# Patient Record
Sex: FEMALE | Race: WHITE | NOT HISPANIC OR LATINO | Employment: FULL TIME | URBAN - METROPOLITAN AREA
[De-identification: names, ages, dates, MRNs, and addresses within clinical notes are randomized per-mention and may not be internally consistent; named-entity substitution may affect disease eponyms.]

---

## 2020-09-01 ENCOUNTER — TRANSCRIBE ORDERS (OUTPATIENT)
Dept: ADMINISTRATIVE | Facility: HOSPITAL | Age: 65
End: 2020-09-01

## 2020-09-01 DIAGNOSIS — E06.3 CHRONIC LYMPHOCYTIC THYROIDITIS: ICD-10-CM

## 2020-09-01 DIAGNOSIS — E04.1 NONTOXIC UNINODULAR GOITER: Primary | ICD-10-CM

## 2021-02-26 DIAGNOSIS — Z23 ENCOUNTER FOR IMMUNIZATION: ICD-10-CM

## 2021-09-02 ENCOUNTER — HOSPITAL ENCOUNTER (OUTPATIENT)
Dept: ULTRASOUND IMAGING | Facility: HOSPITAL | Age: 66
Discharge: HOME | End: 2021-09-02
Attending: OTOLARYNGOLOGY
Payer: MEDICARE

## 2021-09-02 DIAGNOSIS — E04.1 NONTOXIC UNINODULAR GOITER: ICD-10-CM

## 2021-09-02 DIAGNOSIS — E06.3 CHRONIC LYMPHOCYTIC THYROIDITIS: ICD-10-CM

## 2021-09-02 PROCEDURE — 76536 US EXAM OF HEAD AND NECK: CPT

## 2021-09-10 RX ORDER — LANCETS
EACH MISCELLANEOUS
COMMUNITY

## 2021-09-10 RX ORDER — METOPROLOL TARTRATE 50 MG/1
50 TABLET, FILM COATED ORAL EVERY 12 HOURS SCHEDULED
COMMUNITY

## 2021-09-10 RX ORDER — ESCITALOPRAM OXALATE 20 MG/1
20 TABLET ORAL DAILY
COMMUNITY

## 2021-09-10 NOTE — PRE-PROCEDURE INSTRUCTIONS
Pre-Surgery Instructions:   Medication Instructions    Accu-Chek FastClix Lancets MISC Instructed patient per Anesthesia Guidelines   escitalopram (LEXAPRO) 20 mg tablet Instructed patient per Anesthesia Guidelines   metFORMIN (GLUCOPHAGE) 1000 MG tablet Instructed patient per Anesthesia Guidelines   metoprolol tartrate (LOPRESSOR) 50 mg tablet Instructed patient per Anesthesia Guidelines  Pt was instructed to not take metformin the am of surgery  Pt will only be taking metoprolol the am of surgery with a small sip of water

## 2021-09-22 ENCOUNTER — ANESTHESIA EVENT (OUTPATIENT)
Dept: PERIOP | Facility: HOSPITAL | Age: 66
End: 2021-09-22
Payer: MEDICARE

## 2021-09-22 NOTE — ANESTHESIA PREPROCEDURE EVALUATION
Procedure:  RIGHT AURICULAR AND LEFT PERIAURICULAR SEBACEOUS CYST EXCISIONS (Bilateral Face)    Relevant Problems   No relevant active problems        Physical Exam    Airway    Mallampati score: II  TM Distance: >3 FB  Neck ROM: full     Dental       Cardiovascular      Pulmonary      Other Findings        Anesthesia Plan  ASA Score- 2     Anesthesia Type- IV sedation with anesthesia with ASA Monitors  Additional Monitors:   Airway Plan:           Plan Factors-    Chart reviewed  Induction- intravenous  Postoperative Plan-     Informed Consent- Anesthetic plan and risks discussed with patient  I personally reviewed this patient with the CRNA  Discussed and agreed on the Anesthesia Plan with the CRNA  Nicole Hale

## 2021-09-23 ENCOUNTER — ANESTHESIA (OUTPATIENT)
Dept: PERIOP | Facility: HOSPITAL | Age: 66
End: 2021-09-23
Payer: MEDICARE

## 2021-09-23 ENCOUNTER — HOSPITAL ENCOUNTER (OUTPATIENT)
Facility: HOSPITAL | Age: 66
Setting detail: OUTPATIENT SURGERY
Discharge: HOME | End: 2021-09-23
Attending: OTOLARYNGOLOGY | Admitting: OTOLARYNGOLOGY
Payer: MEDICARE

## 2021-09-23 VITALS
BODY MASS INDEX: 35.87 KG/M2 | HEIGHT: 61 IN | TEMPERATURE: 97.6 F | HEART RATE: 77 BPM | SYSTOLIC BLOOD PRESSURE: 151 MMHG | OXYGEN SATURATION: 98 % | RESPIRATION RATE: 20 BRPM | DIASTOLIC BLOOD PRESSURE: 69 MMHG | WEIGHT: 190 LBS

## 2021-09-23 DIAGNOSIS — L72.3 SEBACEOUS CYST: ICD-10-CM

## 2021-09-23 LAB
GLUCOSE SERPL-MCNC: 172 MG/DL (ref 65–140)
GLUCOSE SERPL-MCNC: 196 MG/DL (ref 65–140)

## 2021-09-23 PROCEDURE — 88304 TISSUE EXAM BY PATHOLOGIST: CPT | Performed by: PATHOLOGY

## 2021-09-23 PROCEDURE — 82948 REAGENT STRIP/BLOOD GLUCOSE: CPT

## 2021-09-23 RX ORDER — LIDOCAINE HYDROCHLORIDE 10 MG/ML
INJECTION, SOLUTION EPIDURAL; INFILTRATION; INTRACAUDAL; PERINEURAL AS NEEDED
Status: DISCONTINUED | OUTPATIENT
Start: 2021-09-23 | End: 2021-09-23

## 2021-09-23 RX ORDER — FENTANYL CITRATE/PF 50 MCG/ML
50 SYRINGE (ML) INJECTION
Status: DISCONTINUED | OUTPATIENT
Start: 2021-09-23 | End: 2021-09-23 | Stop reason: HOSPADM

## 2021-09-23 RX ORDER — GINSENG 100 MG
CAPSULE ORAL AS NEEDED
Status: DISCONTINUED | OUTPATIENT
Start: 2021-09-23 | End: 2021-09-23 | Stop reason: HOSPADM

## 2021-09-23 RX ORDER — PROPOFOL 10 MG/ML
INJECTION, EMULSION INTRAVENOUS CONTINUOUS PRN
Status: DISCONTINUED | OUTPATIENT
Start: 2021-09-23 | End: 2021-09-23

## 2021-09-23 RX ORDER — DOXYCYCLINE HYCLATE 100 MG/1
100 CAPSULE ORAL EVERY 12 HOURS SCHEDULED
Qty: 10 CAPSULE | Refills: 0 | Status: SHIPPED | OUTPATIENT
Start: 2021-09-23 | End: 2021-09-28

## 2021-09-23 RX ORDER — LIDOCAINE HYDROCHLORIDE AND EPINEPHRINE 10; 10 MG/ML; UG/ML
INJECTION, SOLUTION INFILTRATION; PERINEURAL AS NEEDED
Status: DISCONTINUED | OUTPATIENT
Start: 2021-09-23 | End: 2021-09-23 | Stop reason: HOSPADM

## 2021-09-23 RX ORDER — DEXAMETHASONE SODIUM PHOSPHATE 4 MG/ML
INJECTION, SOLUTION INTRA-ARTICULAR; INTRALESIONAL; INTRAMUSCULAR; INTRAVENOUS; SOFT TISSUE AS NEEDED
Status: DISCONTINUED | OUTPATIENT
Start: 2021-09-23 | End: 2021-09-23

## 2021-09-23 RX ORDER — SODIUM CHLORIDE, SODIUM LACTATE, POTASSIUM CHLORIDE, CALCIUM CHLORIDE 600; 310; 30; 20 MG/100ML; MG/100ML; MG/100ML; MG/100ML
50 INJECTION, SOLUTION INTRAVENOUS CONTINUOUS
Status: DISCONTINUED | OUTPATIENT
Start: 2021-09-23 | End: 2021-09-23 | Stop reason: HOSPADM

## 2021-09-23 RX ORDER — ONDANSETRON 2 MG/ML
4 INJECTION INTRAMUSCULAR; INTRAVENOUS ONCE AS NEEDED
Status: DISCONTINUED | OUTPATIENT
Start: 2021-09-23 | End: 2021-09-23 | Stop reason: HOSPADM

## 2021-09-23 RX ORDER — PROPOFOL 10 MG/ML
INJECTION, EMULSION INTRAVENOUS AS NEEDED
Status: DISCONTINUED | OUTPATIENT
Start: 2021-09-23 | End: 2021-09-23

## 2021-09-23 RX ORDER — FENTANYL CITRATE 50 UG/ML
INJECTION, SOLUTION INTRAMUSCULAR; INTRAVENOUS AS NEEDED
Status: DISCONTINUED | OUTPATIENT
Start: 2021-09-23 | End: 2021-09-23

## 2021-09-23 RX ORDER — EPHEDRINE SULFATE 50 MG/ML
INJECTION INTRAVENOUS AS NEEDED
Status: DISCONTINUED | OUTPATIENT
Start: 2021-09-23 | End: 2021-09-23

## 2021-09-23 RX ORDER — MIDAZOLAM HYDROCHLORIDE 2 MG/2ML
INJECTION, SOLUTION INTRAMUSCULAR; INTRAVENOUS AS NEEDED
Status: DISCONTINUED | OUTPATIENT
Start: 2021-09-23 | End: 2021-09-23

## 2021-09-23 RX ORDER — GLYCOPYRROLATE 0.2 MG/ML
INJECTION INTRAMUSCULAR; INTRAVENOUS AS NEEDED
Status: DISCONTINUED | OUTPATIENT
Start: 2021-09-23 | End: 2021-09-23

## 2021-09-23 RX ORDER — METOCLOPRAMIDE HYDROCHLORIDE 5 MG/ML
10 INJECTION INTRAMUSCULAR; INTRAVENOUS ONCE AS NEEDED
Status: DISCONTINUED | OUTPATIENT
Start: 2021-09-23 | End: 2021-09-23 | Stop reason: HOSPADM

## 2021-09-23 RX ADMIN — EPHEDRINE SULFATE 5 MG: 50 INJECTION, SOLUTION INTRAVENOUS at 08:04

## 2021-09-23 RX ADMIN — GLYCOPYRROLATE 0.2 MG: 0.2 INJECTION, SOLUTION INTRAMUSCULAR; INTRAVENOUS at 07:49

## 2021-09-23 RX ADMIN — EPHEDRINE SULFATE 5 MG: 50 INJECTION, SOLUTION INTRAVENOUS at 08:16

## 2021-09-23 RX ADMIN — PROPOFOL 20 MG: 10 INJECTION, EMULSION INTRAVENOUS at 07:35

## 2021-09-23 RX ADMIN — SODIUM CHLORIDE, SODIUM LACTATE, POTASSIUM CHLORIDE, AND CALCIUM CHLORIDE: .6; .31; .03; .02 INJECTION, SOLUTION INTRAVENOUS at 07:28

## 2021-09-23 RX ADMIN — DEXAMETHASONE SODIUM PHOSPHATE 4 MG: 4 INJECTION INTRA-ARTICULAR; INTRALESIONAL; INTRAMUSCULAR; INTRAVENOUS; SOFT TISSUE at 07:43

## 2021-09-23 RX ADMIN — EPHEDRINE SULFATE 5 MG: 50 INJECTION, SOLUTION INTRAVENOUS at 08:31

## 2021-09-23 RX ADMIN — FENTANYL CITRATE 25 MCG: 0.05 INJECTION, SOLUTION INTRAMUSCULAR; INTRAVENOUS at 07:34

## 2021-09-23 RX ADMIN — EPHEDRINE SULFATE 10 MG: 50 INJECTION, SOLUTION INTRAVENOUS at 07:52

## 2021-09-23 RX ADMIN — LIDOCAINE HYDROCHLORIDE 50 MG: 10 INJECTION, SOLUTION EPIDURAL; INFILTRATION; INTRACAUDAL at 07:34

## 2021-09-23 RX ADMIN — MIDAZOLAM 2 MG: 1 INJECTION INTRAMUSCULAR; INTRAVENOUS at 07:29

## 2021-09-23 RX ADMIN — PROPOFOL 100 MCG/KG/MIN: 10 INJECTION, EMULSION INTRAVENOUS at 07:34

## 2021-09-23 RX ADMIN — PROPOFOL 40 MG: 10 INJECTION, EMULSION INTRAVENOUS at 07:34

## 2021-09-23 NOTE — INTERIM OP NOTE
RIGHT AURICULAR AND LEFT PERIAURICULAR SEBACEOUS CYST EXCISIONS  Postoperative Note  PATIENT NAME: Jone Javed  : 1955  MRN: [id number]  [facility] ROOM 02    Surgery Date: 2021    Preop Diagnosis:  Sebaceous cyst [L72 3]    Post-Op Diagnosis Codes:     * Sebaceous cyst [L72 3]    Procedure(s) (LRB):  RIGHT AURICULAR AND LEFT PERIAURICULAR SEBACEOUS CYST EXCISIONS (Bilateral)    Surgeon(s) and Role:      Milagros Mahoney MD - Primary    Specimens:  ID Type Source Tests Collected by Time Destination   1 : RIGHT AURICULAR SEBACEOUS CYST Tissue Cyst TISSUE EXAM Noemi Prajapati MD 2021  7:57 AM    2 : LEFT PREAURICULAR SEBACEOUS CYST Tissue Cyst TISSUE EXAM Noemi Prajapati MD 2021  8:22 AM        Estimated Blood Loss:   Minimal    Anesthesia Type:   IV Sedation with Anesthesia     Findings:    2 5 cm right auricular helix sebaceous cyst   1 5 cm left preauricular sebaceous cyst  Complications:   None    SIGNATURE: Noemi Prajapati MD   DATE: 2021   TIME: 8:32 AM

## 2021-09-23 NOTE — DISCHARGE INSTRUCTIONS
You may wash wound gently  Pat dry  Doxycycline 100 mg 2 times daily 5 days  Bacitracin ointment to wound twice daily    Tylenol or ibuprofen as needed for pain

## 2021-09-23 NOTE — OP NOTE
OPERATIVE REPORT  PATIENT NAME: Sofy Avalos    :  1955  MRN: [id number]  Pt Location: [facility] OR ROOM 02    SURGERY DATE: 2021    Surgeon(s) and Role: Wallace José MD - Primary    Preop Diagnosis:  Sebaceous cyst [L72 3]    Post-Op Diagnosis Codes:     * Sebaceous cyst [L72 3]    Procedure(s) (LRB):  RIGHT AURICULAR AND LEFT PERIAURICULAR SEBACEOUS CYST EXCISIONS (Bilateral)    Specimen(s):  ID Type Source Tests Collected by Time Destination   1 : RIGHT AURICULAR SEBACEOUS CYST Tissue Cyst TISSUE EXAM Kota Sanders MD 2021  7:57 AM    2 : LEFT PREAURICULAR SEBACEOUS CYST Tissue Cyst TISSUE EXAM Kota Sanders MD 2021  8:22 AM        Estimated Blood Loss:   Minimal    Drains:  * No LDAs found *    Anesthesia Type:   IV Sedation with Anesthesia    Operative Indications:  Sebaceous cyst [L72 3]  Left preauricular and right auricular helix areas    Operative Findings:  2 5 cm right auricular helix sebaceous cyst and 1 5 cm left preauricular cyst    Complications:   None    Procedure and Technique:  The patient was taken the operating room underwent local IV sedation  Next, an ellipse was diagrammed over the right auricular helix sebaceous cyst which measured 2 5 cm  And it was then injected with 1% lidocaine with epinephrine  Then a vertical the lips was diagrammed over the left preauricular sebaceous cyst and it was injected 1% lidocaine with epinephrine  A time-out was called  The patient was then prepped and draped in the usual fashion  Attention was then turned to the right of regular helix where the helix was then completed with a 15 blade  Dissection was then carried and a subcutaneous level  As this was being performed the cyst was ruptured  There was a mucoid slightly purulent discharge noted  The rest of the capsule identified and then followed circumferentially with use of a curved iris scissors until the remainder of the cyst was completely removed    No residual cystic structure was left remaining in the wound bed  Bipolar cautery at 15 w coag used complete hemostasis  The wound was then closed in layers with interrupted 5 0 Vicryl dermal each and then a running 6 0 nylon was used to approximate skin  Attention was then turned to the left preauricular area  The previously diagrammed vertical ellipse was then cut with a 15 blade and then dissection was then carried circumferentially around the cyst without rupture  The cyst was successfully delivered  Bipolar cautery was used to complete hemostasis  The wound was then irrigated  The wound was then closed in layers with 5 0 Vicryl to suture the dermis together and then running 6 0 nylon was used to approximate skin  The closure of the left preauricular area measured 2 cm and the closure of the right auricular helix area measured 3 cm  Bacitracin ointment was applied  Both specimens were to pathology  The procedure was then terminated and the patient was taken to recovery room in stable condition upon awakening     I was present for the entire procedure    Patient Disposition:  PACU     SIGNATURE: Hunter Hughes MD  DATE: September 23, 2021  TIME: 8:35 AM

## 2021-09-23 NOTE — ANESTHESIA POSTPROCEDURE EVALUATION
Post-Op Assessment Note    CV Status:  Stable  Pain Score: 0    Pain management: adequate  Multimodal analgesia used between 6 hours prior to anesthesia start to PACU discharge    Mental Status:  Alert and awake   Hydration Status:  Euvolemic   PONV Controlled:  Controlled   Airway Patency:  Patent   Two or more mitigation strategies used for obstructive sleep apnea   Post Op Vitals Reviewed: Yes      Staff: CRNA         No complications documented      BP (P) 109/58 (09/23/21 0840)    Temp (P) 97 7 °F (36 5 °C) (09/23/21 0840)    Pulse (P) 79 (09/23/21 0840)   Resp (P) 20 (09/23/21 0840)    SpO2 (P) 97 % (09/23/21 0840)

## 2021-09-23 NOTE — INTERVAL H&P NOTE
H&P reviewed  After examining the patient I find no changes in the patients condition since the H&P had been written      Vitals:    09/23/21 0618   BP: (!) 179/92   Pulse: 56   Resp: 20   Temp: (!) 97 4 °F (36 3 °C)   SpO2: 98%

## 2021-11-08 ENCOUNTER — NURSE TRIAGE (OUTPATIENT)
Dept: OTHER | Facility: OTHER | Age: 66
End: 2021-11-08

## 2021-11-08 DIAGNOSIS — Z20.822 ENCOUNTER FOR SCREENING LABORATORY TESTING FOR COVID-19 VIRUS IN ASYMPTOMATIC PATIENT: Primary | ICD-10-CM

## 2021-11-09 PROCEDURE — U0003 INFECTIOUS AGENT DETECTION BY NUCLEIC ACID (DNA OR RNA); SEVERE ACUTE RESPIRATORY SYNDROME CORONAVIRUS 2 (SARS-COV-2) (CORONAVIRUS DISEASE [COVID-19]), AMPLIFIED PROBE TECHNIQUE, MAKING USE OF HIGH THROUGHPUT TECHNOLOGIES AS DESCRIBED BY CMS-2020-01-R: HCPCS | Performed by: FAMILY MEDICINE

## 2021-11-09 PROCEDURE — U0005 INFEC AGEN DETEC AMPLI PROBE: HCPCS | Performed by: FAMILY MEDICINE

## 2023-03-30 ENCOUNTER — HOSPITAL ENCOUNTER (OUTPATIENT)
Dept: CT IMAGING | Facility: HOSPITAL | Age: 68
End: 2023-03-30

## 2023-03-30 ENCOUNTER — HOSPITAL ENCOUNTER (OUTPATIENT)
Dept: RADIOLOGY | Facility: CLINIC | Age: 68
End: 2023-03-30

## 2023-03-30 VITALS — BODY MASS INDEX: 35.87 KG/M2 | HEIGHT: 61 IN | WEIGHT: 190 LBS

## 2023-03-30 DIAGNOSIS — Z87.891 PERSONAL HISTORY OF NICOTINE DEPENDENCE: ICD-10-CM

## 2023-03-30 DIAGNOSIS — Z12.2 ENCOUNTER FOR SCREENING FOR MALIGNANT NEOPLASM OF RESPIRATORY ORGANS: ICD-10-CM

## 2023-03-30 DIAGNOSIS — Z12.31 ENCOUNTER FOR SCREENING MAMMOGRAM FOR MALIGNANT NEOPLASM OF BREAST: ICD-10-CM

## 2023-04-19 ENCOUNTER — HOSPITAL ENCOUNTER (OUTPATIENT)
Dept: RADIOLOGY | Facility: CLINIC | Age: 68
Discharge: HOME | End: 2023-04-19

## 2023-04-19 DIAGNOSIS — R92.8 ABNORMAL SCREENING MAMMOGRAM: ICD-10-CM

## 2023-10-25 ENCOUNTER — HOSPITAL ENCOUNTER (OUTPATIENT)
Dept: RADIOLOGY | Facility: CLINIC | Age: 68
Discharge: HOME | End: 2023-10-25
Payer: MEDICARE

## 2023-10-25 DIAGNOSIS — R92.8 ABNORMALITY OF RIGHT BREAST ON SCREENING MAMMOGRAM: ICD-10-CM

## 2023-10-25 PROCEDURE — 76642 ULTRASOUND BREAST LIMITED: CPT

## 2024-01-12 ENCOUNTER — HOSPITAL ENCOUNTER (EMERGENCY)
Facility: HOSPITAL | Age: 69
Discharge: HOME | End: 2024-01-12
Attending: EMERGENCY MEDICINE
Payer: MEDICARE

## 2024-01-12 ENCOUNTER — APPOINTMENT (EMERGENCY)
Dept: RADIOLOGY | Facility: HOSPITAL | Age: 69
End: 2024-01-12
Payer: MEDICARE

## 2024-01-12 VITALS
SYSTOLIC BLOOD PRESSURE: 141 MMHG | RESPIRATION RATE: 25 BRPM | DIASTOLIC BLOOD PRESSURE: 77 MMHG | BODY MASS INDEX: 35.45 KG/M2 | OXYGEN SATURATION: 95 % | WEIGHT: 187.61 LBS | HEART RATE: 51 BPM | TEMPERATURE: 98.1 F

## 2024-01-12 DIAGNOSIS — U07.1 COVID: Primary | ICD-10-CM

## 2024-01-12 DIAGNOSIS — R06.02 EXERTIONAL SHORTNESS OF BREATH: ICD-10-CM

## 2024-01-12 LAB
ALBUMIN SERPL BCP-MCNC: 4.2 G/DL (ref 3.5–5)
ALP SERPL-CCNC: 85 U/L (ref 34–104)
ALT SERPL W P-5'-P-CCNC: 20 U/L (ref 7–52)
ANION GAP SERPL CALCULATED.3IONS-SCNC: 7 MMOL/L
AST SERPL W P-5'-P-CCNC: 15 U/L (ref 13–39)
ATRIAL RATE: 50 BPM
BASOPHILS # BLD AUTO: 0.03 THOUSANDS/ÂΜL (ref 0–0.1)
BASOPHILS NFR BLD AUTO: 1 % (ref 0–1)
BILIRUB SERPL-MCNC: 0.59 MG/DL (ref 0.2–1)
BNP SERPL-MCNC: 34 PG/ML (ref 0–100)
BUN SERPL-MCNC: 18 MG/DL (ref 5–25)
CALCIUM SERPL-MCNC: 9.6 MG/DL (ref 8.4–10.2)
CARDIAC TROPONIN I PNL SERPL HS: 4 NG/L
CHLORIDE SERPL-SCNC: 103 MMOL/L (ref 96–108)
CO2 SERPL-SCNC: 25 MMOL/L (ref 21–32)
CREAT SERPL-MCNC: 0.93 MG/DL (ref 0.6–1.3)
EOSINOPHIL # BLD AUTO: 0.14 THOUSAND/ÂΜL (ref 0–0.61)
EOSINOPHIL NFR BLD AUTO: 3 % (ref 0–6)
ERYTHROCYTE [DISTWIDTH] IN BLOOD BY AUTOMATED COUNT: 13.1 % (ref 11.6–15.1)
FLUAV RNA RESP QL NAA+PROBE: NEGATIVE
FLUBV RNA RESP QL NAA+PROBE: NEGATIVE
GFR SERPL CREATININE-BSD FRML MDRD: 62 ML/MIN/1.73SQ M
GLUCOSE SERPL-MCNC: 168 MG/DL (ref 65–140)
HCT VFR BLD AUTO: 43.1 % (ref 34.8–46.1)
HGB BLD-MCNC: 14.3 G/DL (ref 11.5–15.4)
IMM GRANULOCYTES # BLD AUTO: 0.02 THOUSAND/UL (ref 0–0.2)
IMM GRANULOCYTES NFR BLD AUTO: 0 % (ref 0–2)
LYMPHOCYTES # BLD AUTO: 1.15 THOUSANDS/ÂΜL (ref 0.6–4.47)
LYMPHOCYTES NFR BLD AUTO: 24 % (ref 14–44)
MCH RBC QN AUTO: 30 PG (ref 26.8–34.3)
MCHC RBC AUTO-ENTMCNC: 33.2 G/DL (ref 31.4–37.4)
MCV RBC AUTO: 90 FL (ref 82–98)
MONOCYTES # BLD AUTO: 0.33 THOUSAND/ÂΜL (ref 0.17–1.22)
MONOCYTES NFR BLD AUTO: 7 % (ref 4–12)
NEUTROPHILS # BLD AUTO: 3.16 THOUSANDS/ÂΜL (ref 1.85–7.62)
NEUTS SEG NFR BLD AUTO: 65 % (ref 43–75)
NRBC BLD AUTO-RTO: 0 /100 WBCS
P AXIS: 42 DEGREES
PLATELET # BLD AUTO: 274 THOUSANDS/UL (ref 149–390)
PMV BLD AUTO: 10.2 FL (ref 8.9–12.7)
POTASSIUM SERPL-SCNC: 4.5 MMOL/L (ref 3.5–5.3)
PR INTERVAL: 148 MS
PROT SERPL-MCNC: 6.8 G/DL (ref 6.4–8.4)
QRS AXIS: 11 DEGREES
QRSD INTERVAL: 80 MS
QT INTERVAL: 452 MS
QTC INTERVAL: 412 MS
RBC # BLD AUTO: 4.77 MILLION/UL (ref 3.81–5.12)
RSV RNA RESP QL NAA+PROBE: NEGATIVE
SARS-COV-2 RNA RESP QL NAA+PROBE: POSITIVE
SODIUM SERPL-SCNC: 135 MMOL/L (ref 135–147)
T WAVE AXIS: 38 DEGREES
VENTRICULAR RATE: 50 BPM
WBC # BLD AUTO: 4.83 THOUSAND/UL (ref 4.31–10.16)

## 2024-01-12 PROCEDURE — 93005 ELECTROCARDIOGRAM TRACING: CPT

## 2024-01-12 PROCEDURE — 80053 COMPREHEN METABOLIC PANEL: CPT | Performed by: PHYSICIAN ASSISTANT

## 2024-01-12 PROCEDURE — 83880 ASSAY OF NATRIURETIC PEPTIDE: CPT | Performed by: PHYSICIAN ASSISTANT

## 2024-01-12 PROCEDURE — 36415 COLL VENOUS BLD VENIPUNCTURE: CPT | Performed by: PHYSICIAN ASSISTANT

## 2024-01-12 PROCEDURE — 71046 X-RAY EXAM CHEST 2 VIEWS: CPT

## 2024-01-12 PROCEDURE — 99285 EMERGENCY DEPT VISIT HI MDM: CPT | Performed by: PHYSICIAN ASSISTANT

## 2024-01-12 PROCEDURE — 85025 COMPLETE CBC W/AUTO DIFF WBC: CPT | Performed by: PHYSICIAN ASSISTANT

## 2024-01-12 PROCEDURE — 0241U HB NFCT DS VIR RESP RNA 4 TRGT: CPT | Performed by: PHYSICIAN ASSISTANT

## 2024-01-12 PROCEDURE — 84484 ASSAY OF TROPONIN QUANT: CPT | Performed by: PHYSICIAN ASSISTANT

## 2024-01-12 RX ORDER — ALBUTEROL SULFATE 90 UG/1
2 AEROSOL, METERED RESPIRATORY (INHALATION) ONCE
Status: COMPLETED | OUTPATIENT
Start: 2024-01-12 | End: 2024-01-12

## 2024-01-12 RX ORDER — BENZONATATE 100 MG/1
100 CAPSULE ORAL EVERY 8 HOURS
Qty: 21 CAPSULE | Refills: 0 | Status: SHIPPED | OUTPATIENT
Start: 2024-01-12

## 2024-01-12 RX ORDER — DEXAMETHASONE SODIUM PHOSPHATE 10 MG/ML
8 INJECTION, SOLUTION INTRAMUSCULAR; INTRAVENOUS ONCE
Status: COMPLETED | OUTPATIENT
Start: 2024-01-12 | End: 2024-01-12

## 2024-01-12 RX ADMIN — ALBUTEROL SULFATE 2 PUFF: 90 AEROSOL, METERED RESPIRATORY (INHALATION) at 11:21

## 2024-01-12 RX ADMIN — DEXAMETHASONE SODIUM PHOSPHATE 8 MG: 10 INJECTION, SOLUTION INTRAMUSCULAR; INTRAVENOUS at 11:18

## 2024-01-12 NOTE — DISCHARGE INSTRUCTIONS
Continue with good symptomatic care as we discussed.  Please follow-up with our cardiologist a referral has been placed for you and there number has been listed above.  Please return with new or worsening symptoms

## 2024-01-12 NOTE — Clinical Note
Hortensia Rodriguez was seen and treated in our emergency department on 1/12/2024.                Diagnosis:     Hortensia  may return to work on return date.    She may return on this date: 01/22/2024         If you have any questions or concerns, please don't hesitate to call.      Jaqui Rizzo PA-C    ______________________________           _______________          _______________  Hospital Representative                              Date                                Time

## 2024-01-12 NOTE — ED PROVIDER NOTES
History  Chief Complaint   Patient presents with    Cough     Intermittently for 2-3 weeks, and yesterday had some dyspnea upon exertion and chest heaviness. Reports she came in for some cough medicine and to go home.      68-year-old female presents the emergency department for evaluation of cough, congestion.  States symptoms have been intermittent for the past 2 to 3 weeks.  Reports she has a cough for several days which seems to get better for day and then returns.  Had an appoint with her PCP today due to this.  While at this visit she discussed ongoing shortness of breath which spouse states have been ongoing for months.  This is only noted when patient is exerting herself.  States yesterday when she was walking down the [place] of the [employer] where she works she was winded by the end and had some chest heaviness.  States she took a second to rest and then felt improved and was able to walk back without any difficulty.  Patient denies any cardiac history.  Does not follow-up with cardiologist regularly.  At her PCPs office she was told she had an abnormal EKG and instructed to come to the emergency department for further evaluation.  Patient has no chest pain now.  She reports no current shortness of breath at rest.  Denies any leg swelling.  Denies palpitations.  Patient is a former-smoker.        Prior to Admission Medications   Prescriptions Last Dose Informant Patient Reported? Taking?   Accu-Chek FastClix Lancets MISC   Yes No   Sig: Use   escitalopram (LEXAPRO) 20 mg tablet   Yes No   Sig: Take 20 mg by mouth daily   metFORMIN (GLUCOPHAGE) 1000 MG tablet   Yes No   Sig: Take 1,000 mg by mouth daily with breakfast   metoprolol tartrate (LOPRESSOR) 50 mg tablet   Yes No   Sig: Take 50 mg by mouth every 12 (twelve) hours      Facility-Administered Medications: None       Past Medical History:   Diagnosis Date    Anxiety     Cervical cancer (HCC)     age 24    Diabetes mellitus (HCC)     Hashimoto's  disease     Hypertension     Kidney stone     Pt reports that it passes on it's own    Knee pain, left     Pt states it is intermittent.    Sebaceous cyst of ear     Right auricular and left periauricular    Shortness of breath     Pt reports with moderate exertion    Vocal cord nodules     Wears dentures     Wears glasses        Past Surgical History:   Procedure Laterality Date    CERVICAL CONIZATION   W/ LASER      DILATION AND CURETTAGE OF UTERUS      FACIAL/NECK BIOPSY Bilateral 09/23/2021    Procedure: RIGHT AURICULAR AND LEFT PERIAURICULAR SEBACEOUS CYST EXCISIONS;  Surgeon: Shan Veloz MD;  Location: [facility];  Service: ENT    FRACTURE SURGERY Left     Left ankle fracture- has pins, screws and a plate    HYSTERECTOMY  1979    OOPHORECTOMY N/A 1979    one ovary remaining    THROAT SURGERY      Pt had vocal cord nodules removed.    TONSILLECTOMY         Family History   Problem Relation Age of Onset    Cancer Mother         oral    Prostate cancer Father     Kidney cancer Father     No Known Problems Sister     No Known Problems Sister     No Known Problems Daughter     No Known Problems Maternal Grandmother     No Known Problems Maternal Grandfather     No Known Problems Paternal Grandmother     No Known Problems Paternal Grandfather     Colon cancer Maternal Aunt     No Known Problems Maternal Aunt     No Known Problems Maternal Aunt     No Known Problems Maternal Aunt     No Known Problems Maternal Aunt     No Known Problems Paternal Aunt     Breast cancer Neg Hx     BRCA2 Positive Neg Hx     BRCA2 Negative Neg Hx     BRCA1 Negative Neg Hx     BRCA1 Positive Neg Hx     BRCA 1/2 Neg Hx     Endometrial cancer Neg Hx     Ovarian cancer Neg Hx     Breast cancer additional onset Neg Hx      I have reviewed and agree with the history as documented.    E-Cigarette/Vaping    E-Cigarette Use Never User      E-Cigarette/Vaping Substances     Social History     Tobacco Use    Smoking status: Former     Types:  Cigarettes    Smokeless tobacco: Never    Tobacco comments:     Pt quit in 2013   Vaping Use    Vaping status: Never Used   Substance Use Topics    Alcohol use: Yes     Comment: socially    Drug use: Not Currently       Review of Systems   Constitutional:  Negative for appetite change, fatigue and fever.   HENT:  Positive for congestion.    Respiratory:  Positive for cough and shortness of breath (With exertion). Negative for choking.    Cardiovascular:  Negative for chest pain, palpitations and leg swelling.   Gastrointestinal: Negative.    Musculoskeletal: Negative.    Skin: Negative.    Neurological: Negative.    All other systems reviewed and are negative.      Physical Exam  Physical Exam  Vitals and nursing note reviewed.   Constitutional:       General: She is not in acute distress.     Appearance: Normal appearance. She is not ill-appearing, toxic-appearing or diaphoretic.   HENT:      Head: Normocephalic.      Nose: Nose normal.   Eyes:      Conjunctiva/sclera: Conjunctivae normal.      Pupils: Pupils are equal, round, and reactive to light.   Cardiovascular:      Rate and Rhythm: Normal rate and regular rhythm.   Pulmonary:      Effort: Pulmonary effort is normal.      Breath sounds: No stridor. Wheezing present. No rhonchi or rales.   Chest:      Chest wall: No tenderness.   Abdominal:      General: Abdomen is flat. Bowel sounds are normal. There is no distension.      Palpations: Abdomen is soft.      Tenderness: There is no abdominal tenderness. There is no guarding.   Musculoskeletal:         General: Normal range of motion.      Cervical back: Normal range of motion.      Right lower leg: No edema.      Left lower leg: No edema.   Skin:     General: Skin is warm and dry.      Capillary Refill: Capillary refill takes less than 2 seconds.      Findings: No erythema or rash.   Neurological:      General: No focal deficit present.      Mental Status: She is alert.   Psychiatric:         Mood and Affect:  Mood normal.         Vital Signs  ED Triage Vitals   Temperature Pulse Respirations Blood Pressure SpO2   01/12/24 0924 01/12/24 0921 01/12/24 0921 01/12/24 0924 01/12/24 0921   98.1 °F (36.7 °C) 55 18 (!) 200/87 98 %      Temp Source Heart Rate Source Patient Position - Orthostatic VS BP Location FiO2 (%)   01/12/24 0924 01/12/24 0921 01/12/24 0921 01/12/24 0921 --   Temporal Monitor Lying Right arm       Pain Score       01/12/24 0921       No Pain           Vitals:    01/12/24 1030 01/12/24 1045 01/12/24 1100 01/12/24 1115   BP: (!) 174/79  141/77    Pulse: (!) 54 (!) 54 58 (!) 51   Patient Position - Orthostatic VS:   Sitting          Visual Acuity      ED Medications  Medications   albuterol (PROVENTIL HFA,VENTOLIN HFA) inhaler 2 puff (2 puffs Inhalation Given 1/12/24 1121)   dexamethasone (PF) (DECADRON) injection 8 mg (8 mg Intravenous Given 1/12/24 1118)       Diagnostic Studies  Results Reviewed       Procedure Component Value Units Date/Time    HS Troponin I 4hr [[id number]]     Lab Status: No result Specimen: Blood     FLU/RSV/COVID - if FLU/RSV clinically relevant [[id number]]  (Abnormal) Collected: 01/12/24 0948    Lab Status: Final result Specimen: Nares from Nose Updated: 01/12/24 1032     SARS-CoV-2 Positive     INFLUENZA A PCR Negative     INFLUENZA B PCR Negative     RSV PCR Negative    Narrative:      FOR PEDIATRIC PATIENTS - copy/paste COVID Guidelines URL to browser: [email or web address]    SARS-CoV-2 assay is a Nucleic Acid Amplification assay intended for the  qualitative detection of nucleic acid from SARS-CoV-2 in nasopharyngeal  swabs. Results are for the presumptive identification of SARS-CoV-2 RNA.    Positive results are indicative of infection with SARS-CoV-2, the virus  causing COVID-19, but do not rule out bacterial infection or co-infection  with other viruses. Laboratories within the [place] and its  territories are required  to report all positive results to the appropriate  public health authorities. Negative results do not preclude SARS-CoV-2  infection and should not be used as the sole basis for treatment or other  patient management decisions. Negative results must be combined with  clinical observations, patient history, and epidemiological information.  This test has not been FDA cleared or approved.    This test has been authorized by FDA under an Emergency Use Authorization  (EUA). This test is only authorized for the duration of time the  declaration that circumstances exist justifying the authorization of the  emergency use of an in vitro diagnostic tests for detection of SARS-CoV-2  virus and/or diagnosis of COVID-19 infection under section 564(b)(1) of  the Act, 21 U.S.C. 360bbb-3(b)(1), unless the authorization is terminated  or revoked sooner. The test has been validated but independent review by FDA  and CLIA is pending.    Test performed using [employer] GeneXpert: This RT-PCR assay targets N2,  a region unique to SARS-CoV-2. A conserved region in the E-gene was chosen  for pan-Sarbecovirus detection which includes SARS-CoV-2.    According to CMS-2020-01-R, this platform meets the definition of high-throughput technology.    HS Troponin 0hr (reflex protocol) [[id number]]  (Normal) Collected: 01/12/24 0948    Lab Status: Final result Specimen: Blood from Arm, Right Updated: 01/12/24 1015     hs TnI 0hr 4 ng/L     HS Troponin I 2hr [[id number]]     Lab Status: No result Specimen: Blood     B-Type Natriuretic Peptide(BNP) [[id number]]  (Normal) Collected: 01/12/24 0948    Lab Status: Final result Specimen: Blood from Arm, Right Updated: 01/12/24 1015     BNP 34 pg/mL     Comprehensive metabolic panel [[id number]]  (Abnormal) Collected: 01/12/24 0948    Lab Status: Final result Specimen: Blood from Arm, Right Updated: 01/12/24 1008     Sodium 135 mmol/L      Potassium 4.5 mmol/L      Chloride 103 mmol/L      CO2 25 mmol/L       ANION GAP 7 mmol/L      BUN 18 mg/dL      Creatinine 0.93 mg/dL      Glucose 168 mg/dL      Calcium 9.6 mg/dL      AST 15 U/L      ALT 20 U/L      Alkaline Phosphatase 85 U/L      Total Protein 6.8 g/dL      Albumin 4.2 g/dL      Total Bilirubin 0.59 mg/dL      eGFR 62 ml/min/1.73sq m     Narrative:      National Kidney Disease Foundation guidelines for Chronic Kidney Disease (CKD):     Stage 1 with normal or high GFR (GFR > 90 mL/min/1.73 square meters)    Stage 2 Mild CKD (GFR = 60-89 mL/min/1.73 square meters)    Stage 3A Moderate CKD (GFR = 45-59 mL/min/1.73 square meters)    Stage 3B Moderate CKD (GFR = 30-44 mL/min/1.73 square meters)    Stage 4 Severe CKD (GFR = 15-29 mL/min/1.73 square meters)    Stage 5 End Stage CKD (GFR <15 mL/min/1.73 square meters)  Note: GFR calculation is accurate only with a steady state creatinine    CBC and differential [[id number]] Collected: 01/12/24 0948    Lab Status: Final result Specimen: Blood from Arm, Right Updated: 01/12/24 0954     WBC 4.83 Thousand/uL      RBC 4.77 Million/uL      Hemoglobin 14.3 g/dL      Hematocrit 43.1 %      MCV 90 fL      MCH 30.0 pg      MCHC 33.2 g/dL      RDW 13.1 %      MPV 10.2 fL      Platelets 274 Thousands/uL      nRBC 0 /100 WBCs      Neutrophils Relative 65 %      Immat GRANS % 0 %      Lymphocytes Relative 24 %      Monocytes Relative 7 %      Eosinophils Relative 3 %      Basophils Relative 1 %      Neutrophils Absolute 3.16 Thousands/µL      Immature Grans Absolute 0.02 Thousand/uL      Lymphocytes Absolute 1.15 Thousands/µL      Monocytes Absolute 0.33 Thousand/µL      Eosinophils Absolute 0.14 Thousand/µL      Basophils Absolute 0.03 Thousands/µL                    XR chest 2 views   Final Result by Yuridia Colon MD (01/12 1218)      No acute cardiopulmonary disease.               Workstation performed: [id number]                    Procedures  ECG 12 Lead Documentation Only    Date/Time: 1/12/2024 9:37 PM    Performed by:  Jaqui Rizzo PA-C  Authorized by: Jaqui Rizzo PA-C    Patient location:  ED  Interpretation:     Interpretation: non-specific    Rate:     ECG rate:  50    ECG rate assessment: bradycardic    Rhythm:     Rhythm: sinus bradycardia    Ectopy:     Ectopy: none    QRS:     QRS axis:  Normal  Conduction:     Conduction: normal    ST segments:     ST segments:  Normal           ED Course  ED Course as of 01/12/24 1311   Fri Jan 12, 2024   1019 hs TnI 0hr: 4   1019 BNP: 34   1030 Patient resting comfortably.  No complaints at this time.  Updated on results and findings thus far   1103 SARS-COV-2(!): Positive   1103 Discussed results and findings with the patient.  We discussed symptomatic treatment return precautions and follow-up and patient verbalized understanding.  She is clinically hemodynamically stable for discharge                               SBIRT 20yo+      Flowsheet Row Most Recent Value   Initial Alcohol Screen: US AUDIT-C     1. How often do you have a drink containing alcohol? 0 Filed at: 01/12/2024 0921   2. How many drinks containing alcohol do you have on a typical day you are drinking?  0 Filed at: 01/12/2024 0921   3b. FEMALE Any Age, or MALE 65+: How often do you have 4 or more drinks on one occassion? 0 Filed at: 01/12/2024 0921   Audit-C Score 0 Filed at: 01/12/2024 0921   MITALI: How many times in the past year have you...    Used an illegal drug or used a prescription medication for non-medical reasons? Never Filed at: 01/12/2024 0921                      Medical Decision Making  68-year-old female presented to the emergency department for evaluation of cough and exertional shortness of breath.  Vitals and medical record reviewed.  Patient at risk for the following but not limited to COVID, flu, pneumonia, RSV, MI, CHF.  EKG was nonischemic sinus sinus bradycardia patient on metoprolol.  Her troponin within normal limits, low concern for MI.  Her BNP was normal.  ED interpretation of chest  x-ray negative for acute cardiopulmonary findings.  She was found to be COVID-positive.  We discussed symptomatic treatment return precautions and appropriate follow-up and patient verbalized understanding.  Patient was clinically and hemodynamically stable for discharge    Problems Addressed:  COVID: acute illness or injury  Exertional shortness of breath: chronic illness or injury    Amount and/or Complexity of Data Reviewed  Labs: ordered. Decision-making details documented in ED Course.  Radiology: ordered.  ECG/medicine tests: ordered and independent interpretation performed.    Risk  Prescription drug management.             Disposition  Final diagnoses:   COVID   Exertional shortness of breath     Time reflects when diagnosis was documented in both MDM as applicable and the Disposition within this note       Time User Action Codes Description Comment    1/12/2024 11:05 AM Jaqui Rizzo Add [U07.1] COVID     1/12/2024 11:06 AM Jaqui Rizzo [R06.02] Exertional shortness of breath           ED Disposition       ED Disposition   Discharge    Condition   Stable    Date/Time   Fri Jan 12, 2024 1105    Comment   Hortensia Rodriguez discharge to home/self care.                   Follow-up Information       Follow up With Specialties Details Why Contact Info    Yana Neal Sr., MD Internal Medicine   [address]  [address]  [facility] [address]  [phone]      [facility], Nurse Practitioner   [address] [place] [id number] [phone]              Discharge Medication List as of 1/12/2024 11:08 AM        START taking these medications    Details   benzonatate (TESSALON PERLES) 100 mg capsule Take 1 capsule (100 mg total) by mouth every 8 (eight) hours, Starting Fri 1/12/2024, Normal           CONTINUE these medications which have NOT CHANGED    Details   Accu-Chek FastClix Lancets MISC Use, Historical Med      escitalopram (LEXAPRO) 20 mg tablet Take 20 mg by mouth  daily, Historical Med      metFORMIN (GLUCOPHAGE) 1000 MG tablet Take 1,000 mg by mouth daily with breakfast, Historical Med      metoprolol tartrate (LOPRESSOR) 50 mg tablet Take 50 mg by mouth every 12 (twelve) hours, Historical Med                 PDMP Review         Value Time User    PDMP Reviewed  Yes 9/23/2021  8:44 AM Shan Veloz MD            ED Provider  Electronically Signed by             Jaqui Rizzo PA-C  01/12/24 3260

## 2024-01-12 NOTE — Clinical Note
Hortensia Rodriguez was seen and treated in our emergency department on 1/12/2024.                Diagnosis:     Hortensia  may return to work on return date.    She may return on this date: 01/10/2024         If you have any questions or concerns, please don't hesitate to call.      Jaqui Rizzo PA-C    ______________________________           _______________          _______________  Hospital Representative                              Date                                Time

## 2024-01-12 NOTE — Clinical Note
Hortensia Rodriguez was seen and treated in our emergency department on 1/12/2024.                Diagnosis:     Hortensia  may return to work on return date.    She may return on this date: 01/17/2024         If you have any questions or concerns, please don't hesitate to call.      Jaqui Rizzo PA-C    ______________________________           _______________          _______________  Hospital Representative                              Date                                Time

## 2024-04-03 RX ORDER — ATORVASTATIN CALCIUM 20 MG/1
20 TABLET, FILM COATED ORAL DAILY
COMMUNITY
Start: 2024-01-22 | End: 2025-01-21

## 2024-04-03 RX ORDER — LISINOPRIL 5 MG/1
5 TABLET ORAL DAILY
COMMUNITY
Start: 2024-02-27

## 2024-04-03 RX ORDER — ALBUTEROL SULFATE 90 UG/1
1 AEROSOL, METERED RESPIRATORY (INHALATION) EVERY 6 HOURS PRN
COMMUNITY
Start: 2024-01-12

## 2024-04-04 ENCOUNTER — OFFICE VISIT (OUTPATIENT)
Dept: CARDIOLOGY CLINIC | Facility: CLINIC | Age: 69
End: 2024-04-04
Payer: MEDICARE

## 2024-04-04 VITALS
OXYGEN SATURATION: 96 % | WEIGHT: 189 LBS | TEMPERATURE: 97.3 F | HEART RATE: 59 BPM | BODY MASS INDEX: 37.11 KG/M2 | HEIGHT: 60 IN | DIASTOLIC BLOOD PRESSURE: 74 MMHG | SYSTOLIC BLOOD PRESSURE: 154 MMHG

## 2024-04-04 DIAGNOSIS — E78.2 MIXED HYPERLIPIDEMIA: ICD-10-CM

## 2024-04-04 DIAGNOSIS — I10 PRIMARY HYPERTENSION: ICD-10-CM

## 2024-04-04 DIAGNOSIS — R07.2 PRECORDIAL PAIN: ICD-10-CM

## 2024-04-04 DIAGNOSIS — I70.0 THORACIC AORTA ATHEROSCLEROSIS (HCC): ICD-10-CM

## 2024-04-04 DIAGNOSIS — E66.09 CLASS 2 OBESITY DUE TO EXCESS CALORIES WITH BODY MASS INDEX (BMI) OF 36.0 TO 36.9 IN ADULT, UNSPECIFIED WHETHER SERIOUS COMORBIDITY PRESENT: ICD-10-CM

## 2024-04-04 DIAGNOSIS — R06.09 DYSPNEA ON EXERTION: Primary | ICD-10-CM

## 2024-04-04 DIAGNOSIS — I25.118 CORONARY ARTERY DISEASE OF NATIVE ARTERY OF NATIVE HEART WITH STABLE ANGINA PECTORIS (HCC): ICD-10-CM

## 2024-04-04 DIAGNOSIS — E11.9 TYPE 2 DIABETES MELLITUS WITHOUT COMPLICATION, WITHOUT LONG-TERM CURRENT USE OF INSULIN (HCC): ICD-10-CM

## 2024-04-04 PROCEDURE — 99204 OFFICE O/P NEW MOD 45 MIN: CPT | Performed by: STUDENT IN AN ORGANIZED HEALTH CARE EDUCATION/TRAINING PROGRAM

## 2024-04-04 RX ORDER — ASPIRIN 81 MG/1
81 TABLET ORAL DAILY
Start: 2024-04-04

## 2024-04-04 NOTE — PROGRESS NOTES
[facility] [place]  [address] [facility]  [address]  [facility] [address]  Phone#  [phone]  Fax#  [phone]  [facility] Cardiology Office New Patient Visit             NAME: Hortensia Rodriguez  AGE: 69 y.o. SEX: female   : 1955   MRN: [id number]  Assessment and plan:  1. Dyspnea on exertion  -     Ambulatory Referral to Cardiology  -     Echo complete w/ contrast if indicated; Future; Expected date: 2024    2. Precordial pain  -     NM myocardial perfusion spect (rx stress and/or rest); Future; Expected date: 2024    3. Coronary artery disease of native artery of native heart with stable angina pectoris (HCC)    4. Mixed hyperlipidemia    5. Primary hypertension    6. Thoracic aorta atherosclerosis (HCC)  -     aspirin (ECOTRIN LOW STRENGTH) 81 mg EC tablet; Take 1 tablet (81 mg total) by mouth daily    7. Type 2 diabetes mellitus without complication, without long-term current use of insulin (HCC)    8. Class 2 obesity due to excess calories with body mass index (BMI) of 36.0 to 36.9 in adult, unspecified whether serious comorbidity present    -COSBY/chest pain:while in ER, troponin/BNP/CXR wnl. EKG with SB in 50s, no acute ishemic changes. PFTs in 3/2023, normal with normal DLCO. CT with nodules and focal mild groundglass opacity in the PLLL and CAD.  F/u TTE and nuclear stress test. She has not fully recovered from COVID PNA thus will do pharmacologic stress test not exercise. TTE in  wiith good LV function 60% and no significant valvular pathologies.   -Lipid panel 2023: , LDL-C 83, HDL 50, . LDL goal <55 due to CAD, atherosclerotic aortic disease and DM2. PCP has recently increased atorvastatin from 10 to 20 mg qd. PCP is managing.    -BP is slightly elevated, very recently lisinopril was increased to 5 mg from 2.5 mg qd by PCP, metoprolol  tart 50 mg bid. PCP is managing.  -HgbA1 7.5% ( 2024), on metformin. Discussed diet recs and  weight loss importance.   -aortic atherosclerosis. Started  asprin 81 mg qd, atorvastatin was recently increased to 20 mg qd.   -Might benefit from GLP-1RA such as semaglutide for weith loss, CAD and DM2 management.    RTC 1 month      Chief Complaint   Patient presents with    New Patient Visit     Pt states she had covid pneumonia in January     HPI:  Hortensia Rodriguez is a 69 y.o. female who presents to the cardiology clinic for exertional shortness of breath. She had an acute epsiode that started at work in 1/2024. Then she was seen in ER for that and was found to have COVID PNA. Prior to COVID Pna she has been having exertional shortness of breath, especially when walking up hill. Admits to occasional chest pain with no triggers.    Sochx: former, quitted 10 years ago, 1-1.5 ppd x 30 years, occasional alcohol use, denies illict drug use  Fhx: father had MI at 68 yo, CVA. Maternal grandmother, maternal aunts/uncles with MI.     Review of Systems denies palpitations, leg swelling, orthopnea, paroxysmal exertional dyspnea or syncope.      Past history, family history, social history, current medications, vital signs, recent lab and imaging studies and  prior cardiology studies reviewed independently on this visit.    Allergies   Allergen Reactions    Cephalexin Anaphylaxis     Pt states she had hives all over her body and in her mouth. Pt was given benadryl right before she had trouble breathing     Current Outpatient Medications:     Accu-Chek FastClix Lancets MISC, Use, Disp: , Rfl:     albuterol (PROVENTIL HFA,VENTOLIN HFA) 90 mcg/act inhaler, Inhale 1 puff every 6 (six) hours as needed, Disp: , Rfl:     aspirin (ECOTRIN LOW STRENGTH) 81 mg EC tablet, Take 1 tablet (81 mg total) by mouth daily, Disp: , Rfl:     atorvastatin (LIPITOR) 20 mg tablet, Take 20 mg by mouth daily, Disp: , Rfl:     escitalopram (LEXAPRO) 20 mg tablet, Take 20 mg by mouth daily, Disp: , Rfl:     lisinopril (ZESTRIL) 5 mg tablet, Take 5 mg  "by mouth daily, Disp: , Rfl:     metFORMIN (GLUCOPHAGE) 1000 MG tablet, Take 1,000 mg by mouth 2 (two) times a day with meals, Disp: , Rfl:     metoprolol tartrate (LOPRESSOR) 50 mg tablet, Take 50 mg by mouth every 12 (twelve) hours, Disp: , Rfl:     benzonatate (TESSALON PERLES) 100 mg capsule, Take 1 capsule (100 mg total) by mouth every 8 (eight) hours, Disp: 21 capsule, Rfl: 0    Objective:   Vitals:    04/04/24 0909   BP: 154/74   Pulse: 59   Temp: (!) 97.3 °F (36.3 °C)   SpO2: 96%     Wt Readings from Last 3 Encounters:   04/04/24 85.7 kg (189 lb)   01/12/24 85.1 kg (187 lb 9.8 oz)   03/30/23 86.2 kg (190 lb)     Pulse Readings from Last 3 Encounters:   04/04/24 59   01/12/24 (!) 51   09/23/21 77     BP Readings from Last 3 Encounters:   04/04/24 154/74   01/12/24 141/77   09/23/21 151/69     Physical Exam  General Appearance:    Alert, cooperative, no distress, appears stated age   Head:    atraumatic   Neck:   Supple,  no carotid  bruit or JVD   Lungs:     Clear to auscultation bilaterally, respirations unlabored   Chest wall:    No tenderness or deformity   Heart:    Regular rate and rhythm, S1 and S2 normal, no murmur, rub    or gallop   Abdomen:     Soft, non-tender, no organomegaly   Extremities:   Extremities no cyanosis or edema   Skin:   Skin color, texture, turgor normal, no rashes or lesions      Pertinent Laboratory/Diagnostic Studies:    Laboratory studies reviewed personally by Katie Perales DO    BMP:   Lab Results   Component Value Date    SODIUM 135 01/12/2024    K 4.5 01/12/2024     01/12/2024    CO2 25 01/12/2024    BUN 18 01/12/2024    CREATININE 0.93 01/12/2024    GLUC 168 (H) 01/12/2024    CALCIUM 9.6 01/12/2024     CBC:  Lab Results   Component Value Date    WBC 4.83 01/12/2024    HGB 14.3 01/12/2024    HCT 43.1 01/12/2024    MCV 90 01/12/2024     01/12/2024     Lipid Profile:   No results found for: \"HDL\"  No results found for: \"LDLCALC\"  No results found for: " "\"TRIG\"   Other labs:  Lab Results   Component Value Date    TSH 1.68 11/17/2023     Lab Results   Component Value Date    ALT 20 01/12/2024    AST 15 01/12/2024     Imaging Studies:  Pertinent imaging studies and cardiac studies were independently reviewed on this visit and findings summarized.    Katie Perales DO  Portions of the record may have been created with voice recognition software.  Occasional wrong word or \"sound alike\" substitutions may have occurred due to the inherent limitations of voice recognition software.  Read the chart carefully and recognize, using context, where substitutions have occurred. Please reach out to me directly for any clarifications.   "

## 2024-05-01 ENCOUNTER — HOSPITAL ENCOUNTER (OUTPATIENT)
Dept: RADIOLOGY | Facility: CLINIC | Age: 69
Discharge: HOME | End: 2024-05-01
Payer: MEDICARE

## 2024-05-01 VITALS — HEIGHT: 60 IN | WEIGHT: 187 LBS | BODY MASS INDEX: 36.71 KG/M2

## 2024-05-01 DIAGNOSIS — R92.8 ABNORMAL MAMMOGRAM: ICD-10-CM

## 2024-05-01 DIAGNOSIS — R92.8 ABNORMALITY OF RIGHT BREAST ON SCREENING MAMMOGRAM: ICD-10-CM

## 2024-05-01 PROCEDURE — 76642 ULTRASOUND BREAST LIMITED: CPT

## 2024-05-01 PROCEDURE — G0279 TOMOSYNTHESIS, MAMMO: HCPCS

## 2024-05-01 PROCEDURE — 77066 DX MAMMO INCL CAD BI: CPT

## 2024-05-07 ENCOUNTER — HOSPITAL ENCOUNTER (OUTPATIENT)
Dept: NON INVASIVE DIAGNOSTICS | Facility: HOSPITAL | Age: 69
Discharge: HOME | End: 2024-05-07
Attending: STUDENT IN AN ORGANIZED HEALTH CARE EDUCATION/TRAINING PROGRAM
Payer: MEDICARE

## 2024-05-07 ENCOUNTER — HOSPITAL ENCOUNTER (OUTPATIENT)
Dept: NUCLEAR MEDICINE | Facility: HOSPITAL | Age: 69
Discharge: HOME | End: 2024-05-07
Attending: STUDENT IN AN ORGANIZED HEALTH CARE EDUCATION/TRAINING PROGRAM
Payer: MEDICARE

## 2024-05-07 VITALS
HEIGHT: 61 IN | SYSTOLIC BLOOD PRESSURE: 154 MMHG | WEIGHT: 187 LBS | HEART RATE: 70 BPM | DIASTOLIC BLOOD PRESSURE: 74 MMHG | BODY MASS INDEX: 35.3 KG/M2

## 2024-05-07 DIAGNOSIS — R07.2 PRECORDIAL PAIN: ICD-10-CM

## 2024-05-07 DIAGNOSIS — R06.09 DYSPNEA ON EXERTION: ICD-10-CM

## 2024-05-07 LAB
AORTIC ROOT: 3 CM
APICAL FOUR CHAMBER EJECTION FRACTION: 70 %
E WAVE DECELERATION TIME: 310 MS
E/A RATIO: 1.18
FRACTIONAL SHORTENING: 37 (ref 28–44)
INTERVENTRICULAR SEPTUM IN DIASTOLE (PARASTERNAL SHORT AXIS VIEW): 0.9 CM
INTERVENTRICULAR SEPTUM: 0.9 CM (ref 0.6–1.1)
LAAS-AP2: 16.4 CM2
LAAS-AP4: 14 CM2
LEFT ATRIUM SIZE: 3.6 CM
LEFT ATRIUM VOLUME (MOD BIPLANE): 33 ML
LEFT ATRIUM VOLUME INDEX (MOD BIPLANE): 18.2 ML/M2
LEFT INTERNAL DIMENSION IN SYSTOLE: 2.6 CM (ref 2.1–4)
LEFT VENTRICLE DIASTOLIC VOLUME (MOD BIPLANE): 48 ML
LEFT VENTRICLE SYSTOLIC VOLUME (MOD BIPLANE): 16 ML
LEFT VENTRICULAR INTERNAL DIMENSION IN DIASTOLE: 4.1 CM (ref 3.5–6)
LEFT VENTRICULAR POSTERIOR WALL IN END DIASTOLE: 0.9 CM
LEFT VENTRICULAR STROKE VOLUME: 51 ML
LV EF: 67 %
LVSV (TEICH): 51 ML
MAX HR: 72 BPM
MV E'TISSUE VEL-LAT: 10 CM/S
MV E'TISSUE VEL-SEP: 5 CM/S
MV PEAK A VEL: 0.97 M/S
MV PEAK E VEL: 114 CM/S
MV STENOSIS PRESSURE HALF TIME: 90 MS
MV VALVE AREA P 1/2 METHOD: 2.44
NUC STRESS EJECTION FRACTION: 74 %
RATE PRESSURE PRODUCT: NORMAL
RIGHT ATRIUM AREA SYSTOLE A4C: 10.9 CM2
RIGHT VENTRICLE ID DIMENSION: 4.1 CM
SL CV LEFT ATRIUM LENGTH A2C: 5.9 CM
SL CV PED ECHO LEFT VENTRICLE DIASTOLIC VOLUME (MOD BIPLANE) 2D: 76 ML
SL CV PED ECHO LEFT VENTRICLE SYSTOLIC VOLUME (MOD BIPLANE) 2D: 24 ML
SL CV REST NUCLEAR ISOTOPE DOSE: 11 MCI
SL CV STRESS NUCLEAR ISOTOPE DOSE: 32.7 MCI
SL CV STRESS RECOVERY BP: NORMAL MMHG
SL CV STRESS RECOVERY HR: 61 BPM
SL CV STRESS RECOVERY O2 SAT: 97 %
STRESS ANGINA INDEX: 0
STRESS BASELINE BP: NORMAL MMHG
STRESS BASELINE HR: 50 BPM
STRESS O2 SAT REST: 97 %
STRESS PEAK HR: 72 BPM
STRESS POST EXERCISE DUR MIN: 3 MIN
STRESS POST EXERCISE DUR SEC: 0 SEC
STRESS POST O2 SAT PEAK: 97 %
STRESS POST PEAK BP: 142 MMHG
TRICUSPID ANNULAR PLANE SYSTOLIC EXCURSION: 2 CM

## 2024-05-07 PROCEDURE — 93306 TTE W/DOPPLER COMPLETE: CPT

## 2024-05-07 PROCEDURE — A9502 TC99M TETROFOSMIN: HCPCS

## 2024-05-07 PROCEDURE — 78452 HT MUSCLE IMAGE SPECT MULT: CPT | Performed by: INTERNAL MEDICINE

## 2024-05-07 PROCEDURE — 78452 HT MUSCLE IMAGE SPECT MULT: CPT

## 2024-05-07 PROCEDURE — 93016 CV STRESS TEST SUPVJ ONLY: CPT | Performed by: INTERNAL MEDICINE

## 2024-05-07 PROCEDURE — 93017 CV STRESS TEST TRACING ONLY: CPT

## 2024-05-07 PROCEDURE — 93306 TTE W/DOPPLER COMPLETE: CPT | Performed by: INTERNAL MEDICINE

## 2024-05-07 PROCEDURE — 93018 CV STRESS TEST I&R ONLY: CPT | Performed by: INTERNAL MEDICINE

## 2024-05-07 RX ORDER — GLIPIZIDE 5 MG/1
5 TABLET, FILM COATED, EXTENDED RELEASE ORAL DAILY
COMMUNITY

## 2024-05-07 RX ORDER — REGADENOSON 0.08 MG/ML
0.4 INJECTION, SOLUTION INTRAVENOUS ONCE
Status: COMPLETED | OUTPATIENT
Start: 2024-05-07 | End: 2024-05-07

## 2024-05-07 RX ADMIN — REGADENOSON 0.4 MG: 0.08 INJECTION, SOLUTION INTRAVENOUS at 10:42

## 2024-05-14 LAB
CHEST PAIN STATEMENT: NORMAL
MAX DIASTOLIC BP: 78 MMHG
MAX PREDICTED HEART RATE: 150 BPM
PROTOCOL NAME: NORMAL
REASON FOR TERMINATION: NORMAL
STRESS POST EXERCISE DUR MIN: 3 MIN
STRESS POST EXERCISE DUR SEC: 0 SEC
STRESS POST PEAK HR: 72 BPM
STRESS POST PEAK SYSTOLIC BP: 144 MMHG
TARGET HR FORMULA: NORMAL
TEST INDICATION: NORMAL

## 2024-05-29 NOTE — PROGRESS NOTES
[facility] [place]  [address]  [address]  [facility] [address]  Phone#  [phone]  Fax#  [phone]  [facility] Cardiology Office Follow-up Visit             NAME: Hortensia Rodriguez  AGE: 69 y.o. SEX: female   : 1955   MRN: [id number]  Assessment and plan:  1. Dyspnea on exertion  2. Precordial pain  3. Coronary artery disease of native artery of native heart with stable angina pectoris (HCC)  4. Mixed hyperlipidemia  5. Primary hypertension  -     lisinopril (ZESTRIL) 5 mg tablet; Take 1.5 tablets (7.5 mg total) by mouth daily  6. Thoracic aorta atherosclerosis (HCC)  7. Type 2 diabetes mellitus without complication, without long-term current use of insulin (HCC)  8. Class 2 obesity due to excess calories with body mass index (BMI) of 36.0 to 36.9 in adult, unspecified whether serious comorbidity present    -COSBY/chest pain:   -while in ER, troponin/BNP/CXR wnl. EKG with SB in 50s bpm, no acute ishemic changes. PFTs in 3/2023, normal with normal DLCO. CT chest 3/2023: with pulmonary nodules and focal mild groundglass opacity in the PLLL and CAD.   -TTE 2024: LVEF 70%, no wall motion abnormalities, no signifincat valvular pathologies. Pharmacologic nuclear stress test 2024: no perufsion defects, LVEF 74%.  -most likley due to uncontrolled HTN and weight. Will continue with management of both.    -Lipid panel 2023: , LDL-C 83, HDL 50, . LDL goal <55 due to CAD, atherosclerotic aortic disease and DM2. PCP has recently increased atorvastatin from 10 to 20 mg qd. PCP is managing.    -BP is slightly elevated 158/80 with recheck in 145/85 mmHg.Sshe reports SBP in high 130-140ss, DBP in mid 80s.at home.will increase lisinopril to 7.5 mg qd and continue metoprolol  tart 50 mg bid. She will check her BP starting in 10-14 days and let us or PCP know if SBP is still close to 140s and DBP is close to 90s for further medication adjustments.   -HgbA1 7.5% (  "2/2024), on metformin. Discussed diet recs and weight loss importance.   -aortic atherosclerosis. Started  asprin 81 mg qd, atorvastatin was recently increased to 20 mg qd.   -Might benefit from GLP-1RA such as semaglutide for weith loss, CAD and DM2 management.    RTC 6 month       Cc: \"follow up on cardiac problems\"    HPI:  4/4/2024: Hortensia Rodriguez is a 69 y.o. female who presents to the cardiology clinic for exertional shortness of breath. She had an acute epsiode that started at work in 1/2024. Then she was seen in ER for that and was found to have COVID PNA. Prior to COVID Pna she has been having exertional shortness of breath, especially when walking up hill. Admits to occasional chest pain with no triggers.    5/29/2024:Ms. Rodriguez still admits to tiredness, shortness of breath with taking hills.      Sochx: former, quitted 10 years ago, 1-1.5 ppd x 30 years, occasional alcohol use, denies illict drug use  Fhx: father had MI at 70 yo, CVA. Maternal grandmother, maternal aunts/uncles with MI.    Review of Systems denies chest pain, palpitations, leg swelling, orthopnea, paroxysmal exertional dyspnea or syncope.      Past history, family history, social history, current medications, vital signs, recent lab and imaging studies and  prior cardiology studies reviewed independently on this visit.    Allergies   Allergen Reactions    Cephalexin Anaphylaxis     Pt states she had hives all over her body and in her mouth. Pt was given benadryl right before she had trouble breathing     Current Outpatient Medications:     Accu-Chek FastClix Lancets MISC, Use, Disp: , Rfl:     albuterol (PROVENTIL HFA,VENTOLIN HFA) 90 mcg/act inhaler, Inhale 1 puff every 6 (six) hours as needed, Disp: , Rfl:     aspirin (ECOTRIN LOW STRENGTH) 81 mg EC tablet, Take 1 tablet (81 mg total) by mouth daily, Disp: , Rfl:     atorvastatin (LIPITOR) 20 mg tablet, Take 20 mg by mouth daily, Disp: , Rfl:     escitalopram (LEXAPRO) 20 mg tablet, " "Take 20 mg by mouth daily, Disp: , Rfl:     glipiZIDE (GLUCOTROL XL) 5 mg 24 hr tablet, Take 5 mg by mouth daily, Disp: , Rfl:     lisinopril (ZESTRIL) 5 mg tablet, Take 1.5 tablets (7.5 mg total) by mouth daily, Disp: 90 tablet, Rfl: 3    metFORMIN (GLUCOPHAGE) 1000 MG tablet, Take 1,000 mg by mouth 2 (two) times a day with meals, Disp: , Rfl:     metoprolol tartrate (LOPRESSOR) 50 mg tablet, Take 50 mg by mouth every 12 (twelve) hours, Disp: , Rfl:     Objective:   Vitals:    05/30/24 0826   BP: 158/80   Pulse: 60   SpO2: 98%     Wt Readings from Last 3 Encounters:   05/30/24 84.8 kg (187 lb)   05/07/24 84.8 kg (187 lb)   05/01/24 84.8 kg (187 lb)     Pulse Readings from Last 3 Encounters:   05/30/24 60   05/07/24 70   04/04/24 59     BP Readings from Last 3 Encounters:   05/30/24 158/80   05/07/24 154/74   04/04/24 154/74     Physical Exam  General Appearance:    Alert, cooperative, no distress, appears stated age   Head:    atraumatic   Neck:   Supple,  no carotid  bruit or JVD   Lungs:     Clear to auscultation bilaterally, respirations unlabored   Chest wall:    No tenderness or deformity   Heart:    Regular rate and rhythm, S1 and S2 normal, no murmur, rub    or gallop   Abdomen:     Soft, non-tender, no organomegaly   Extremities:   Extremities no cyanosis or edema   Skin:   Skin color, texture, turgor normal, no rashes or lesions      Pertinent Laboratory/Diagnostic Studies:    Laboratory studies reviewed personally by Katie Perales DO    BMP:   Lab Results   Component Value Date    SODIUM 135 01/12/2024    K 4.5 01/12/2024     01/12/2024    CO2 25 01/12/2024    BUN 18 01/12/2024    CREATININE 0.93 01/12/2024    GLUC 168 (H) 01/12/2024    CALCIUM 9.6 01/12/2024     CBC:  Lab Results   Component Value Date    WBC 4.83 01/12/2024    HGB 14.3 01/12/2024    HCT 43.1 01/12/2024    MCV 90 01/12/2024     01/12/2024     Lipid Profile:   No results found for: \"HDL\"  No results found for: " "\"LDLCALC\"  No results found for: \"TRIG\"   Other labs:  Lab Results   Component Value Date    TSH 1.68 11/17/2023     Lab Results   Component Value Date    ALT 20 01/12/2024    AST 15 01/12/2024     Imaging Studies:  Pertinent imaging studies and cardiac studies were independently reviewed on this visit and findings summarized.    Katie Perales DO  Portions of the record may have been created with voice recognition software.  Occasional wrong word or \"sound alike\" substitutions may have occurred due to the inherent limitations of voice recognition software.  Read the chart carefully and recognize, using context, where substitutions have occurred. Please reach out to me directly for any clarifications.   "

## 2024-05-30 ENCOUNTER — OFFICE VISIT (OUTPATIENT)
Dept: CARDIOLOGY CLINIC | Facility: CLINIC | Age: 69
End: 2024-05-30
Payer: MEDICARE

## 2024-05-30 VITALS
OXYGEN SATURATION: 98 % | HEIGHT: 61 IN | DIASTOLIC BLOOD PRESSURE: 80 MMHG | WEIGHT: 187 LBS | SYSTOLIC BLOOD PRESSURE: 158 MMHG | HEART RATE: 60 BPM | BODY MASS INDEX: 35.3 KG/M2

## 2024-05-30 DIAGNOSIS — R06.09 DYSPNEA ON EXERTION: Primary | ICD-10-CM

## 2024-05-30 DIAGNOSIS — E11.9 TYPE 2 DIABETES MELLITUS WITHOUT COMPLICATION, WITHOUT LONG-TERM CURRENT USE OF INSULIN (HCC): ICD-10-CM

## 2024-05-30 DIAGNOSIS — E78.2 MIXED HYPERLIPIDEMIA: ICD-10-CM

## 2024-05-30 DIAGNOSIS — I70.0 THORACIC AORTA ATHEROSCLEROSIS (HCC): ICD-10-CM

## 2024-05-30 DIAGNOSIS — I10 PRIMARY HYPERTENSION: ICD-10-CM

## 2024-05-30 DIAGNOSIS — E66.09 CLASS 2 OBESITY DUE TO EXCESS CALORIES WITH BODY MASS INDEX (BMI) OF 36.0 TO 36.9 IN ADULT, UNSPECIFIED WHETHER SERIOUS COMORBIDITY PRESENT: ICD-10-CM

## 2024-05-30 DIAGNOSIS — R07.2 PRECORDIAL PAIN: ICD-10-CM

## 2024-05-30 DIAGNOSIS — I25.118 CORONARY ARTERY DISEASE OF NATIVE ARTERY OF NATIVE HEART WITH STABLE ANGINA PECTORIS (HCC): ICD-10-CM

## 2024-05-30 PROCEDURE — 99214 OFFICE O/P EST MOD 30 MIN: CPT | Performed by: STUDENT IN AN ORGANIZED HEALTH CARE EDUCATION/TRAINING PROGRAM

## 2024-05-30 RX ORDER — LISINOPRIL 5 MG/1
7.5 TABLET ORAL DAILY
Qty: 90 TABLET | Refills: 3 | Status: SHIPPED | OUTPATIENT
Start: 2024-05-30

## 2024-11-25 ENCOUNTER — OFFICE VISIT (OUTPATIENT)
Dept: CARDIOLOGY CLINIC | Facility: CLINIC | Age: 69
End: 2024-11-25
Payer: MEDICARE

## 2024-11-25 VITALS
HEART RATE: 78 BPM | OXYGEN SATURATION: 98 % | DIASTOLIC BLOOD PRESSURE: 98 MMHG | HEIGHT: 61 IN | WEIGHT: 190 LBS | SYSTOLIC BLOOD PRESSURE: 194 MMHG | BODY MASS INDEX: 35.87 KG/M2 | TEMPERATURE: 96.2 F

## 2024-11-25 DIAGNOSIS — E78.2 MIXED HYPERLIPIDEMIA: ICD-10-CM

## 2024-11-25 DIAGNOSIS — I10 PRIMARY HYPERTENSION: ICD-10-CM

## 2024-11-25 DIAGNOSIS — R55 SYNCOPE, UNSPECIFIED SYNCOPE TYPE: ICD-10-CM

## 2024-11-25 DIAGNOSIS — E11.9 TYPE 2 DIABETES MELLITUS WITHOUT COMPLICATION, WITHOUT LONG-TERM CURRENT USE OF INSULIN (HCC): Primary | ICD-10-CM

## 2024-11-25 DIAGNOSIS — R06.09 DOE (DYSPNEA ON EXERTION): ICD-10-CM

## 2024-11-25 PROCEDURE — G2211 COMPLEX E/M VISIT ADD ON: HCPCS | Performed by: INTERNAL MEDICINE

## 2024-11-25 PROCEDURE — 99214 OFFICE O/P EST MOD 30 MIN: CPT | Performed by: INTERNAL MEDICINE

## 2024-11-25 RX ORDER — LISINOPRIL 20 MG/1
20 TABLET ORAL DAILY
Qty: 90 TABLET | Refills: 3 | Status: SHIPPED | OUTPATIENT
Start: 2024-11-25

## 2024-11-25 NOTE — ASSESSMENT & PLAN NOTE
Lab Results   Component Value Date    HGBA1C 8.0 (H) 06/23/2022   Needs better glycemic control.  Needs to be more active and aggressive lifestyle modification measures.

## 2024-11-25 NOTE — ASSESSMENT & PLAN NOTE
Lipid profile from November 2023 showed LDL on target with slightly elevated triglycerides primarily due to suboptimal glycemic control.  Will continue the same dose of atorvastatin.

## 2024-11-25 NOTE — ASSESSMENT & PLAN NOTE
Her syncopal history is unusual and not suggestive of any cardiac pathology.  However for the sake of full evaluation, we will proceed with a 7-day event monitor as well as a carotid duplex study.

## 2024-11-25 NOTE — ASSESSMENT & PLAN NOTE
Blood pressure is suboptimal.  Patient reports that blood pressure can range from 140-150 range at home.  I have advised her to increase the lisinopril to 20 mg daily and keep a regular home blood pressure diary.  Target blood pressure is less than 130/80.

## 2024-11-25 NOTE — PROGRESS NOTES
[facility] [place]  [address]  [address]  [facility] [address] [phone]    Patient Name: Hortensia Rodriguez  YOB: 1955 ;female  MR No: [id number]        Diagnosis ICD-10-CM Associated Orders   1. Type 2 diabetes mellitus without complication, without long-term current use of insulin (HCC)  E11.9       2. Mixed hyperlipidemia  E78.2       3. COSBY (dyspnea on exertion)  R06.09       4. Primary hypertension  I10 lisinopril (ZESTRIL) 20 mg tablet      5. Syncope, unspecified syncope type  R55 VAS carotid complete study     AMB extended holter monitor           Assessment and recommendations:    1. Type 2 diabetes mellitus without complication, without long-term current use of insulin ([facility])  Assessment & Plan:    Lab Results   Component Value Date    HGBA1C 8.0 (H) 06/23/2022   Needs better glycemic control.  Needs to be more active and aggressive lifestyle modification measures.  2. Mixed hyperlipidemia  Assessment & Plan:  Lipid profile from November 2023 showed LDL on target with slightly elevated triglycerides primarily due to suboptimal glycemic control.  Will continue the same dose of atorvastatin.  3. COSBY (dyspnea on exertion)  Assessment & Plan:  It appears that her chronic dyspnea on exertion got worse after multiple episodes of COVID-19 infection particularly the last 1.  There is no suggestion of any underlying cardiac pathology.  Lexiscan-MPI in May 2024:  Normal resting EKG.  Normal regadenoson stress EKG.  Normal perfusion imaging.  Lexiscan-MPI is negative for ischemia or infarction.  Normal left ventricular systolic function on gated study.  Echocardiogram EF 2024:  Normal left ventricular systolic function with no significant valvular pathology.  4. Primary hypertension  Assessment & Plan:  Blood pressure is suboptimal.  Patient reports that blood pressure can range from 140-150 range at home.  I have advised her to increase the  lisinopril to 20 mg daily and keep a regular home blood pressure diary.  Target blood pressure is less than 130/80.  Orders:  -     lisinopril (ZESTRIL) 20 mg tablet; Take 1 tablet (20 mg total) by mouth daily  5. Syncope, unspecified syncope type  Assessment & Plan:  Her syncopal history is unusual and not suggestive of any cardiac pathology.  However for the sake of full evaluation, we will proceed with a 7-day event monitor as well as a carotid duplex study.  Orders:  -     VAS carotid complete study; Future; Expected date: 11/25/2024  -     AMB extended holter monitor; Future; Expected date: 11/25/2024     I will be in touch with the patient once have reviewed the results of her carotid duplex and 7-day ZIO monitor.  We will see her back in follow-up in 6 months.      CHIEF COMPLAINT:      Syncope, HTN    HPI:   69-year-old female with past medical history significant for hypertension, type 2 diabetes mellitus, mixed hyperlipidemia presents for follow-up of chronic dyspnea on exertion and recent syncope.  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  She recalls no preceding symptoms.  She had 1 episode of syncope in the past as well.  She continues to have dyspnea on exertion particularly on uphill exertion.  She denies any angina.  She denies any palpitations.    Past Medical History:   Diagnosis Date    Anxiety     Cervical cancer (HCC)     age 24    Diabetes mellitus (HCC)     Hashimoto's disease     Hypertension     Kidney stone     Pt reports that it passes on it's own    Knee pain, left     Pt states it is intermittent.    Sebaceous cyst of ear     Right auricular and left periauricular    Shortness of breath     Pt reports with moderate exertion    Vocal cord nodules     Wears dentures     Wears glasses           CURRENT  MEDICATIONS:      Current Outpatient Medications:     Accu-Chek FastClix Lancets  MISC, Use, Disp: , Rfl:     albuterol (PROVENTIL HFA,VENTOLIN HFA) 90 mcg/act inhaler, Inhale 1 puff every 6 (six) hours as needed, Disp: , Rfl:     aspirin (ECOTRIN LOW STRENGTH) 81 mg EC tablet, Take 1 tablet (81 mg total) by mouth daily, Disp: , Rfl:     atorvastatin (LIPITOR) 20 mg tablet, Take 20 mg by mouth daily, Disp: , Rfl:     escitalopram (LEXAPRO) 20 mg tablet, Take 20 mg by mouth daily, Disp: , Rfl:     glipiZIDE (GLUCOTROL XL) 5 mg 24 hr tablet, Take 5 mg by mouth daily, Disp: , Rfl:     lisinopril (ZESTRIL) 20 mg tablet, Take 1 tablet (20 mg total) by mouth daily, Disp: 90 tablet, Rfl: 3    metFORMIN (GLUCOPHAGE) 1000 MG tablet, Take 1,000 mg by mouth 2 (two) times a day with meals, Disp: , Rfl:     metoprolol tartrate (LOPRESSOR) 50 mg tablet, Take 50 mg by mouth every 12 (twelve) hours, Disp: , Rfl:     ALLERGIES  Allergies   Allergen Reactions    Cephalexin Anaphylaxis     Pt states she had hives all over her body and in her mouth. Pt was given benadryl right before she had trouble breathing       Lab Results   Component Value Date    CREATININE 0.93 01/12/2024    CREATININE 0.91 11/17/2023    K 4.5 01/12/2024    K 5.0 11/17/2023    SODIUM 135 01/12/2024    SODIUM 141 11/17/2023    TSH 1.68 11/17/2023       I have personally reviewed the most recent ECG from January 12, 2024 which showed sinus bradycardia with possible old septal infarct versus lead placement.      REVIEW OF SYSTEMS   Positive for: Dyspnea on exertion, fatigue, cough, syncope  Negative for: All remaining as reviewed below and in HPI.    SYSTEM SYMPTOMS REVIEWED:  General--weight change, fever, night sweats  Respiratory--cough, wheezing, shortness of breath, sputum production  Cardiovascular--chest pain, syncope, dyspnea on exertion, edema, decline in exercise tolerance, claudication   Gastrointestinal--persistent vomiting, diarrhea, abdominal distention, blood in stool   Muscular or skeletal--joint pain or swelling  "  Neurologic--headaches, syncope, abnormal movement  Hematologic--history of easy bruising and bleeding   Endocrine--thyroid enlargement, heat or cold intolerance, polyuria   Psychiatric--anxiety, depression     General physical examination:    General appearance: Alert, no acute distress, appears stated age, moderate obesity.  HEENT: Mucous membranes are moist.  No obvious abnormality noted.  Neck: Supple with no lymphadenopathy.  No JVD.  Carotid pulses are intact.  No carotid bruit.  Cardiovascular system: Regular rhythm.  Normal S1 and S2.  No murmurs.  No rubs or gallops. Extremities: No edema. No cyanosis.  Pulmonary: Respirations unlabored.  Good air entry bilaterally.  Clear to auscultation bilaterally.  Gastrointestinal: Abdomen is soft and nontender.  Bowel sounds are positive.  Musculoskeletal: Upper Extremities: Normal upper motor strength. Lower Extremity: Normal motor strength. Gait: Normal.   Skin: Skin is warm. No rashes or lesions.  Neurological: Patient is alert and oriented with no gross motor deficits.  Psychiatric: Mood is normal.  Behavior is normal.    Vitals:    11/25/24 0821   BP: (!) 194/98   Pulse: 78   Temp: (!) 96.2 °F (35.7 °C)   SpO2: 98%      Body mass index is 35.9 kg/m².  Wt Readings from Last 3 Encounters:   11/25/24 86.2 kg (190 lb)   05/30/24 84.8 kg (187 lb)   05/07/24 84.8 kg (187 lb)             Saroj Snyder MD, [facility], [place]    Portions of the record  have been created with voice recognition software.  Occasional grammatical mistakes or wrong word or \"sound alike\" substitutions may have occurred due to the inherent limitations of voice recognition software. Please reach out to me directly for any clarifications.   "

## 2024-11-25 NOTE — ASSESSMENT & PLAN NOTE
It appears that her chronic dyspnea on exertion got worse after multiple episodes of COVID-19 infection particularly the last 1.  There is no suggestion of any underlying cardiac pathology.  Lexiscan-MPI in May 2024:  Normal resting EKG.  Normal regadenoson stress EKG.  Normal perfusion imaging.  Lexiscan-MPI is negative for ischemia or infarction.  Normal left ventricular systolic function on gated study.  Echocardiogram EF 2024:  Normal left ventricular systolic function with no significant valvular pathology.

## 2024-12-02 ENCOUNTER — HOSPITAL ENCOUNTER (OUTPATIENT)
Facility: CLINIC | Age: 69
Discharge: HOME | End: 2024-12-02
Payer: MEDICARE

## 2024-12-02 ENCOUNTER — HOSPITAL ENCOUNTER (OUTPATIENT)
Dept: RADIOLOGY | Facility: HOSPITAL | Age: 69
Discharge: HOME | End: 2024-12-02
Payer: MEDICARE

## 2024-12-02 DIAGNOSIS — R55 SYNCOPE, UNSPECIFIED SYNCOPE TYPE: ICD-10-CM

## 2024-12-02 DIAGNOSIS — M25.512 ACUTE PAIN OF LEFT SHOULDER: ICD-10-CM

## 2024-12-02 PROCEDURE — 73030 X-RAY EXAM OF SHOULDER: CPT

## 2024-12-02 PROCEDURE — 93880 EXTRACRANIAL BILAT STUDY: CPT | Performed by: SURGERY

## 2024-12-16 ENCOUNTER — RESULTS FOLLOW-UP (OUTPATIENT)
Dept: CARDIOLOGY CLINIC | Facility: CLINIC | Age: 69
End: 2024-12-16

## 2024-12-16 ENCOUNTER — CLINICAL SUPPORT (OUTPATIENT)
Dept: CARDIOLOGY CLINIC | Facility: CLINIC | Age: 69
End: 2024-12-16
Payer: MEDICARE

## 2024-12-16 DIAGNOSIS — R55 SYNCOPE, UNSPECIFIED SYNCOPE TYPE: ICD-10-CM

## 2024-12-16 PROCEDURE — 93248 EXT ECG>7D<15D REV&INTERPJ: CPT | Performed by: INTERNAL MEDICINE

## 2025-04-30 ENCOUNTER — HOSPITAL ENCOUNTER (OUTPATIENT)
Dept: RADIOLOGY | Facility: CLINIC | Age: 70
Discharge: HOME | End: 2025-04-30
Payer: MEDICARE

## 2025-04-30 VITALS — BODY MASS INDEX: 35.12 KG/M2 | WEIGHT: 186 LBS | HEIGHT: 61 IN

## 2025-04-30 DIAGNOSIS — R92.8 ABNORMALITY OF RIGHT BREAST ON SCREENING MAMMOGRAM: ICD-10-CM

## 2025-04-30 DIAGNOSIS — R92.8 ABNORMAL MAMMOGRAM: ICD-10-CM

## 2025-04-30 PROCEDURE — 76642 ULTRASOUND BREAST LIMITED: CPT

## 2025-04-30 PROCEDURE — 77066 DX MAMMO INCL CAD BI: CPT

## 2025-04-30 PROCEDURE — G0279 TOMOSYNTHESIS, MAMMO: HCPCS

## 2025-07-08 ENCOUNTER — OFFICE VISIT (OUTPATIENT)
Dept: CARDIOLOGY CLINIC | Facility: CLINIC | Age: 70
End: 2025-07-08
Payer: MEDICARE

## 2025-07-08 VITALS
HEIGHT: 61 IN | DIASTOLIC BLOOD PRESSURE: 70 MMHG | HEART RATE: 64 BPM | TEMPERATURE: 97.2 F | OXYGEN SATURATION: 97 % | WEIGHT: 188 LBS | SYSTOLIC BLOOD PRESSURE: 138 MMHG | BODY MASS INDEX: 35.5 KG/M2

## 2025-07-08 DIAGNOSIS — E11.9 TYPE 2 DIABETES MELLITUS WITHOUT COMPLICATION, WITHOUT LONG-TERM CURRENT USE OF INSULIN (HCC): ICD-10-CM

## 2025-07-08 DIAGNOSIS — E78.2 MIXED HYPERLIPIDEMIA: ICD-10-CM

## 2025-07-08 DIAGNOSIS — I10 PRIMARY HYPERTENSION: ICD-10-CM

## 2025-07-08 DIAGNOSIS — R06.09 DOE (DYSPNEA ON EXERTION): Primary | ICD-10-CM

## 2025-07-08 DIAGNOSIS — R55 SYNCOPE, UNSPECIFIED SYNCOPE TYPE: ICD-10-CM

## 2025-07-08 PROCEDURE — 93000 ELECTROCARDIOGRAM COMPLETE: CPT | Performed by: INTERNAL MEDICINE

## 2025-07-08 PROCEDURE — 99214 OFFICE O/P EST MOD 30 MIN: CPT | Performed by: INTERNAL MEDICINE

## 2025-07-08 PROCEDURE — G2211 COMPLEX E/M VISIT ADD ON: HCPCS | Performed by: INTERNAL MEDICINE

## 2025-07-08 NOTE — PROGRESS NOTES
[facility]  [address]  [address]  [facility] [address] [phone]    Patient Name: Hortensia Rodriguez  YOB: 1955 ;female  MR No: [id number]        Diagnosis ICD-10-CM Associated Orders   1. COSBY (dyspnea on exertion)  R06.09 POCT ECG      2. Primary hypertension  I10       3. Syncope, unspecified syncope type  R55       4. Mixed hyperlipidemia  E78.2       5. Type 2 diabetes mellitus without complication, without long-term current use of insulin (HCC)  E11.9            Assessment and recommendations:    1. COSBY (dyspnea on exertion)  -     POCT ECG  2. Primary hypertension  3. Syncope, unspecified syncope type  4. Mixed hyperlipidemia  5. Type 2 diabetes mellitus without complication, without long-term current use of insulin (HCC)       It appears that her chronic dyspnea on exertion got worse after multiple episodes of COVID-19 infection particularly the last one.  There is no suggestion of any underlying cardiac pathology. Lexiscan-MPI in May 2024 showed normal resting ECG showed normal sinus rhythm with no ST-segment abnormality.,  No ischemia or infarction and normal left regular systolic function.  Echocardiogram from 2024 showed normal left atrial systolic function with no significant valvular pathology.  Her dyspnea on exertion is primarily due to her weight and deconditioning.  We discussed lifestyle modification measures needed to improve her endurance.  Blood pressure is much better now on higher dose of lisinopril.  Her syncopal history is unusual and not suggestive of any cardiac pathology.  Recent 7-day ZIO monitor from December 2024 showed normal sinus rhythm with only few PVCs and no SVT or VT.      CHIEF COMPLAINT:      Hypertension, PVCs, palpitations    HPI:   70-year-old female with past medical history significant for hypertension, type 2 diabetes mellitus, mixed hyperlipidemia, palpitations secondary to PVCs presents  for follow-up of chronic dyspnea on exertion.  She continues to have symptoms of dyspnea on exertion but denies any chest pain or syncope.  Palpitations are fairly infrequent.   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  She recalls no preceding symptoms.  She had one episode of syncope in the past as well.      Past Medical History[1]       CURRENT  MEDICATIONS:    Current Medications[2]    ALLERGIES  Allergies   Allergen Reactions    Cephalexin Anaphylaxis     Pt states she had hives all over her body and in her mouth. Pt was given benadryl right before she had trouble breathing       Lab Results   Component Value Date    CREATININE 0.93 01/12/2024    CREATININE 0.91 11/17/2023    K 4.5 01/12/2024    K 5.0 11/17/2023    SODIUM 135 01/12/2024    SODIUM 141 11/17/2023    TSH 1.68 11/17/2023       I have personally reviewed the most recent ECG from today which showed normal sinus rhythm at 60 beats a minute.  Normal ECG.      REVIEW OF SYSTEMS   Positive for: Dyspnea on exertion, arthritis, palpitations  Negative for: All remaining as reviewed below and in HPI.    SYSTEM SYMPTOMS REVIEWED:  General--weight change, fever, night sweats  Respiratory--cough, wheezing, shortness of breath, sputum production  Cardiovascular--chest pain, syncope, dyspnea on exertion, edema, decline in exercise tolerance, claudication   Gastrointestinal--persistent vomiting, diarrhea, abdominal distention, blood in stool   Muscular or skeletal--joint pain or swelling   Neurologic--headaches, syncope, abnormal movement  Hematologic--history of easy bruising and bleeding   Endocrine--thyroid enlargement, heat or cold intolerance, polyuria   Psychiatric--anxiety, depression     General physical examination:    General appearance: Alert, no acute distress, appears stated age.  Mild obesity  HEENT: Mucous membranes are moist.  No obvious  "abnormality noted.  Neck: Supple with no lymphadenopathy.  No JVD.  Carotid pulses are intact.  No carotid bruit.  Cardiovascular system: Regular rhythm.  Normal S1 and S2.  No murmurs.  No rubs or gallops. Extremities: No edema. No cyanosis.  Pulmonary: Respirations unlabored.  Good air entry bilaterally.  Clear to auscultation bilaterally.  Gastrointestinal: Abdomen is soft and nontender.  Bowel sounds are positive.  Musculoskeletal: Upper Extremities: Normal upper motor strength. Lower Extremity: Normal motor strength. Gait: Normal.   Skin: Skin is warm. No rashes or lesions.  Neurological: Patient is alert and oriented with no gross motor deficits.  Psychiatric: Mood is normal.  Behavior is normal.    Vitals:    07/08/25 0913   BP: 138/70   Pulse: 64   Temp: (!) 97.2 °F (36.2 °C)   SpO2: 97%      Body mass index is 35.52 kg/m².  Wt Readings from Last 3 Encounters:   07/08/25 85.3 kg (188 lb)   04/30/25 84.4 kg (186 lb)   11/25/24 86.2 kg (190 lb)             Saroj Snyder MD, FACC, FASREZA    Portions of the record  have been created with voice recognition software.  Occasional grammatical mistakes or wrong word or \"sound alike\" substitutions may have occurred due to the inherent limitations of voice recognition software. Please reach out to me directly for any clarifications.          [1]   Past Medical History:  Diagnosis Date    Anxiety     Cervical cancer (HCC)     age 24    Diabetes mellitus (HCC)     Hashimoto's disease     Hypertension     Kidney stone     Pt reports that it passes on it's own    Knee pain, left     Pt states it is intermittent.    Sebaceous cyst of ear     Right auricular and left periauricular    Shortness of breath     Pt reports with moderate exertion    Vocal cord nodules     Wears dentures     Wears glasses    [2]   Current Outpatient Medications:     Accu-Chek FastClix Lancets MISC, Use, Disp: , Rfl:     albuterol (PROVENTIL HFA,VENTOLIN HFA) 90 mcg/act inhaler, Inhale 1 puff every 6 " (six) hours as needed, Disp: , Rfl:     aspirin (ECOTRIN LOW STRENGTH) 81 mg EC tablet, Take 1 tablet (81 mg total) by mouth daily, Disp: , Rfl:     atorvastatin (LIPITOR) 20 mg tablet, Take 20 mg by mouth in the morning., Disp: , Rfl:     escitalopram (LEXAPRO) 20 mg tablet, Take 20 mg by mouth in the morning., Disp: , Rfl:     glipiZIDE (GLUCOTROL XL) 5 mg 24 hr tablet, Take 5 mg by mouth in the morning., Disp: , Rfl:     lisinopril (ZESTRIL) 20 mg tablet, Take 1 tablet (20 mg total) by mouth daily, Disp: 90 tablet, Rfl: 3    metFORMIN (GLUCOPHAGE) 1000 MG tablet, Take 1,000 mg by mouth in the morning and 1,000 mg in the evening. Take with meals., Disp: , Rfl:     metoprolol tartrate (LOPRESSOR) 50 mg tablet, Take 50 mg by mouth every 12 (twelve) hours, Disp: , Rfl:

## (undated) DEVICE — BIPOLAR CORD DISP

## (undated) DEVICE — CHLORAPREP HI-LITE 26ML ORANGE

## (undated) DEVICE — PENCIL ELECTROSURG E-Z CLEAN -0035H

## (undated) DEVICE — ELECTRODE NEEDLE MEGAFINE 2IN E-Z CLEAN MEGADYNE -0118

## (undated) DEVICE — COTTON BALLS: Brand: DEROYAL

## (undated) DEVICE — SPONGE STICK WITH PVP-I: Brand: KENDALL

## (undated) DEVICE — GLOVE INDICATOR PI UNDERGLOVE SZ 7.5 BLUE

## (undated) DEVICE — INTENDED FOR TISSUE SEPARATION, AND OTHER PROCEDURES THAT REQUIRE A SHARP SURGICAL BLADE TO PUNCTURE OR CUT.: Brand: BARD-PARKER SAFETY BLADES SIZE 15, STERILE

## (undated) DEVICE — SYRINGE 10ML LL

## (undated) DEVICE — BETHLEHEM UNIVERSAL MINOR GEN: Brand: CARDINAL HEALTH

## (undated) DEVICE — UTILITY MARKER,BLACK WITH LABELS: Brand: DEVON

## (undated) DEVICE — SPECIMEN CONTAINER: Brand: CARDINAL HEALTH

## (undated) DEVICE — NEEDLE 25G X 1 1/2

## (undated) DEVICE — PLUMEPEN PRO 10FT

## (undated) DEVICE — MEDI-VAC YANK SUCT HNDL W/TPRD BULBOUS TIP: Brand: CARDINAL HEALTH

## (undated) DEVICE — TUBING SUCTION 5MM X 12 FT

## (undated) DEVICE — SPECIMEN CONTAINER STERILE PEEL PACK

## (undated) DEVICE — SUT VICRYL 4-0 PS-2 27 IN J426H

## (undated) DEVICE — PAD GROUNDING ADULT

## (undated) DEVICE — MEDI-VAC YANKAUER SUCTION HANDLE W/STRAIGHT TIP & CONTROL VENT: Brand: CARDINAL HEALTH

## (undated) DEVICE — SUT VICRYL 5-0 RB-1 27 IN J213H

## (undated) DEVICE — ADHESIVE SKIN HIGH VISCOSITY EXOFIN 1ML

## (undated) DEVICE — TIBURON SPLIT SHEET: Brand: CONVERTORS

## (undated) DEVICE — SUT ETHILON 6-0 PC-1 18 IN 1856G